# Patient Record
Sex: FEMALE | Race: OTHER | HISPANIC OR LATINO | URBAN - METROPOLITAN AREA
[De-identification: names, ages, dates, MRNs, and addresses within clinical notes are randomized per-mention and may not be internally consistent; named-entity substitution may affect disease eponyms.]

---

## 2019-09-14 ENCOUNTER — EMERGENCY (EMERGENCY)
Facility: HOSPITAL | Age: 54
LOS: 1 days | Discharge: HOME | End: 2019-09-14
Attending: EMERGENCY MEDICINE
Payer: MEDICAID

## 2019-09-14 VITALS
DIASTOLIC BLOOD PRESSURE: 84 MMHG | OXYGEN SATURATION: 100 % | SYSTOLIC BLOOD PRESSURE: 129 MMHG | TEMPERATURE: 98 F | WEIGHT: 119.93 LBS | HEART RATE: 70 BPM | HEIGHT: 59 IN | RESPIRATION RATE: 16 BRPM

## 2019-09-14 PROCEDURE — 99283 EMERGENCY DEPT VISIT LOW MDM: CPT

## 2019-09-14 PROCEDURE — 99283 EMERGENCY DEPT VISIT LOW MDM: CPT | Mod: 25

## 2019-09-14 PROCEDURE — 10060 I&D ABSCESS SIMPLE/SINGLE: CPT

## 2019-09-14 RX ORDER — IBUPROFEN 200 MG
600 TABLET ORAL ONCE
Refills: 0 | Status: COMPLETED | OUTPATIENT
Start: 2019-09-14 | End: 2019-09-14

## 2019-09-14 RX ORDER — IBUPROFEN 200 MG
1 TABLET ORAL
Qty: 20 | Refills: 0
Start: 2019-09-14 | End: 2019-09-18

## 2019-09-14 RX ADMIN — Medication 100 MILLIGRAM(S): at 18:54

## 2019-09-14 RX ADMIN — Medication 600 MILLIGRAM(S): at 19:00

## 2019-09-14 RX ADMIN — Medication 600 MILLIGRAM(S): at 18:54

## 2019-09-14 NOTE — ED ADULT NURSE NOTE - OBJECTIVE STATEMENT
c/o cyst between buttocks x3 weeks ,popped open 5 days ago. c/o cyst between buttocks x3 weeks ,popped open 5 days ago. I&D of pilonidal abscess with packing application done by Pee CERON.

## 2019-09-14 NOTE — ED PROVIDER NOTE - PHYSICAL EXAMINATION
Pilonidal area with small wound 1 cm diameter with periwound induration and tenderness to palpation. Mild erythema.

## 2019-09-14 NOTE — ED PROCEDURE NOTE - ATTENDING CONTRIBUTION TO CARE
Abscess drained by NP.     Patient discharged by NP. I evaluated the patient only on initial arrival and was available for general supervision throughout stay.

## 2019-09-14 NOTE — ED PROVIDER NOTE - OBJECTIVE STATEMENT
53 year-old female, no significant PMHx, presents with cc cyst x 3 weeks. Noticed a painful cyst at the crease between buttocks (pilonidal area). 5 days ago cyst bursts and has been draining since. Patient still reports pain and sensitivity to the area. Denies any rectal pain, fever, chills or any other complaints. Has had history of cysts/abscess to axilla in the past.

## 2019-09-14 NOTE — ED PROVIDER NOTE - PATIENT PORTAL LINK FT
You can access the FollowMyHealth Patient Portal offered by [facility] by registering at the following website: [email or web address]. By joining [facility]’s FollowMyHealth portal, you will also be able to view your health information using other applications (apps) compatible with our system.

## 2019-09-14 NOTE — ED PROVIDER NOTE - PROGRESS NOTE DETAILS
I&D performed with scant purulent drainage. Will treat with doxy and will need to come back for wound check in 2 days. Pt is well appearing walking with steady gait, stable for discharge and follow up without fail with medical doctor. I had a detailed discussion with the patient and/or guardian regarding the historical points, exam findings, and any diagnostic results supporting the discharge diagnosis. Pt educated on care and need for follow up. Strict return instructions and red flag signs and symptoms discussed with patient. Questions answered. Pt shows understanding of discharge information and agrees to follow.

## 2019-09-14 NOTE — ED PROVIDER NOTE - ATTENDING CONTRIBUTION TO CARE
52 yo F with right gluteal abscess. Mild drainage on exam. Fluctuant on exam. Purulent drainage during I&D. Doxy given. Return to PMD in 2 days for wound check. Vital signs stable. Nontoxic and medically stable for discharged. Return precautions provided and patient understands to return to the ED for worsening signs and symptoms. Instructed to follow up with primary care physician and agreeable. Patient's questions answered.    I performed the initial face to face bedside interview with this patient regarding history of present illness, review of symptoms and past medical, social and family history.  I completed an independent physical examination.  I was the initial provider who evaluated this patient.  The history, review of symptoms and examination was documented by the scribe in my presence and I attest to the accuracy of the documentation.  I have signed out the follow up of any pending tests (i.e. labs, radiological studies) to the NP. I have discussed the patient’s plan of care and disposition with the NP.

## 2019-09-16 ENCOUNTER — EMERGENCY (EMERGENCY)
Facility: HOSPITAL | Age: 54
LOS: 1 days | Discharge: HOME | End: 2019-09-16
Attending: EMERGENCY MEDICINE
Payer: MEDICAID

## 2019-09-16 VITALS
SYSTOLIC BLOOD PRESSURE: 116 MMHG | OXYGEN SATURATION: 100 % | TEMPERATURE: 98 F | WEIGHT: 123.02 LBS | DIASTOLIC BLOOD PRESSURE: 78 MMHG | RESPIRATION RATE: 17 BRPM | HEIGHT: 59 IN | HEART RATE: 82 BPM

## 2019-09-16 PROBLEM — Z78.9 OTHER SPECIFIED HEALTH STATUS: Chronic | Status: ACTIVE | Noted: 2019-09-14

## 2019-09-16 PROCEDURE — G0463: CPT

## 2019-09-16 RX ORDER — ACETAMINOPHEN 500 MG
1000 TABLET ORAL ONCE
Refills: 0 | Status: DISCONTINUED | OUTPATIENT
Start: 2019-09-16 | End: 2019-09-16

## 2019-09-16 NOTE — ED PROVIDER NOTE - PHYSICAL EXAMINATION
Drain was fallen out, mild amount of purulent drainage as well as some  granulation tissue, no redness streaking.

## 2019-09-16 NOTE — ED PROVIDER NOTE - OBJECTIVE STATEMENT
52 yo female with  no PMHx presenting to ED needing wound check of pilonidal cyst. Patient was seen two days ago had ID and packing placed, as well as PO ABX. Denies fever, chills, n/v, states she has some pain.

## 2019-09-16 NOTE — ED PROVIDER NOTE - PATIENT PORTAL LINK FT
You can access the FollowMyHealth Patient Portal offered by [facility] by registering at the following website: [email or web address]. By joining [employer] [facility]’s FollowMyHealth portal, you will also be able to view your health information using other applications (apps) compatible with our system.

## 2022-01-07 NOTE — ED PROVIDER NOTE - DISCHARGE DATE
16-Sep-2019 Complex Repair Preamble Text (Leave Blank If You Do Not Want): Extensive wide undermining was performed.
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
